# Patient Record
Sex: MALE | Race: WHITE
[De-identification: names, ages, dates, MRNs, and addresses within clinical notes are randomized per-mention and may not be internally consistent; named-entity substitution may affect disease eponyms.]

---

## 2018-07-30 ENCOUNTER — HOSPITAL ENCOUNTER (OUTPATIENT)
Dept: HOSPITAL 45 - C.RAD | Age: 78
Discharge: HOME | End: 2018-07-30
Attending: FAMILY MEDICINE
Payer: COMMERCIAL
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
## 2018-07-30 DIAGNOSIS — S83.511A: Primary | ICD-10-CM

## 2018-07-30 NOTE — DIAGNOSTIC IMAGING REPORT
R KNEE 3 VIEWS



HISTORY:  78 years-old Male SPRAIN OF ANTERIOR CRUIATE LIGAMENT OF RT KNEE acute

right knee pain without reported trauma



COMPARISON: None available



TECHNIQUE: 3 views of the right knee



FINDINGS: 

Bones appear mildly demineralized. Tricompartmental osteoarthritis with moderate

lateral and patellofemoral and mild medial compartment joint space narrowing

with marginal spurring. Spurring of the tibial spines. 6 mm corticated

ossification medial to the medial femoral condyle within the expected location

of the MCL. Peripheral arterial calcifications are noted. Small joint effusion

without acute fracture or dislocation.



IMPRESSION: 

1. No acute fracture or dislocation.

2. Tricompartmental osteoarthritis as above.

3. Small joint effusion. 







The above report was generated using voice recognition software. It may contain

grammatical, syntax or spelling errors.







Electronically signed by:  Nghia Torres M.D.

7/30/2018 10:37 AM



Dictated Date/Time:  7/30/2018 10:36 AM